# Patient Record
Sex: MALE | Race: WHITE | Employment: FULL TIME | URBAN - METROPOLITAN AREA
[De-identification: names, ages, dates, MRNs, and addresses within clinical notes are randomized per-mention and may not be internally consistent; named-entity substitution may affect disease eponyms.]

---

## 2020-03-05 ENCOUNTER — OFFICE VISIT (OUTPATIENT)
Dept: FAMILY MEDICINE | Facility: CLINIC | Age: 25
End: 2020-03-05
Payer: COMMERCIAL

## 2020-03-05 VITALS
OXYGEN SATURATION: 96 % | HEART RATE: 74 BPM | BODY MASS INDEX: 20.72 KG/M2 | HEIGHT: 71 IN | DIASTOLIC BLOOD PRESSURE: 71 MMHG | TEMPERATURE: 98 F | WEIGHT: 148 LBS | RESPIRATION RATE: 16 BRPM | SYSTOLIC BLOOD PRESSURE: 123 MMHG

## 2020-03-05 DIAGNOSIS — L72.0 EPIDERMOID CYST: Primary | ICD-10-CM

## 2020-03-05 PROCEDURE — 99202 OFFICE O/P NEW SF 15 MIN: CPT | Performed by: PHYSICIAN ASSISTANT

## 2020-03-05 ASSESSMENT — MIFFLIN-ST. JEOR: SCORE: 1697.57

## 2020-03-05 NOTE — PROGRESS NOTES
"Subjective     Joey Curran is a 24 year old male who presents to clinic today for the following health issues:    HPI   Concern - Lump - near left armpit  Onset: noticed 3 days ago on 3/2/2020    Description:   palpable to the touch    Intensity: mild    Progression of Symptoms:  same    Accompanying Signs & Symptoms:  none    Previous history of similar problem:   none    Precipitating factors:   Worsened by: none    Alleviating factors:  Improved by: none    Therapies Tried and outcome: none    No weight changes. No fevers or noc sweats. No profound fatigue.   Not painful. Noticed incidentally when his gf was giving him a massage.     There is no problem list on file for this patient.    No past surgical history on file.    Social History     Tobacco Use     Smoking status: Current Every Day Smoker     Smokeless tobacco: Never Used   Substance Use Topics     Alcohol use: Yes     Comment: every weekend     History reviewed. No pertinent family history.      No current outpatient medications on file.     No Known Allergies  No lab results found.   BP Readings from Last 3 Encounters:   03/05/20 123/71    Wt Readings from Last 3 Encounters:   03/05/20 67.1 kg (148 lb)                      Reviewed and updated as needed this visit by Provider         Review of Systems   ROS COMP: Constitutional, HEENT, cardiovascular, pulmonary, GI, , musculoskeletal, neuro, skin, endocrine and psych systems are negative, except as otherwise noted.      Objective    /71   Pulse 74   Temp 98  F (36.7  C)   Resp 16   Ht 1.81 m (5' 11.26\")   Wt 67.1 kg (148 lb)   SpO2 96%   BMI 20.49 kg/m    Body mass index is 20.49 kg/m .  Physical Exam   Eye exam - right eye normal lid, conjunctiva, cornea, pupil and fundus, left eye normal lid, conjunctiva, cornea, pupil and fundus.  Thyroid not palpable, not enlarged, no nodules detected.  CHEST:chest clear to IPPA, no tachypnea, retractions or cyanosis and S1, S2 normal, no " murmur, no gallop, rate regular.  Small sub centimeter well circumscribed subcutaneous cystic lesion left lateral chest . Non tender. Mobile.   No lymphadenopathy.    Joey was seen today for mass and health maintenance.    Diagnoses and all orders for this visit:    Epidermoid cyst      Patient reassured . He will continue to monitor for change in size over the next year.   Discussed pathophysiology behind this type of cyst .